# Patient Record
Sex: FEMALE | Race: WHITE | NOT HISPANIC OR LATINO | Employment: FULL TIME | URBAN - METROPOLITAN AREA
[De-identification: names, ages, dates, MRNs, and addresses within clinical notes are randomized per-mention and may not be internally consistent; named-entity substitution may affect disease eponyms.]

---

## 2017-12-27 ENCOUNTER — APPOINTMENT (OUTPATIENT)
Dept: WOMENS IMAGING | Facility: HOSPITAL | Age: 42
End: 2017-12-27

## 2017-12-27 PROCEDURE — 77063 BREAST TOMOSYNTHESIS BI: CPT | Performed by: RADIOLOGY

## 2017-12-27 PROCEDURE — 77067 SCR MAMMO BI INCL CAD: CPT | Performed by: RADIOLOGY

## 2019-08-29 ENCOUNTER — TELEPHONE (OUTPATIENT)
Dept: FAMILY MEDICINE CLINIC | Facility: CLINIC | Age: 44
End: 2019-08-29

## 2019-08-29 NOTE — TELEPHONE ENCOUNTER
"Patient Care Coordinators received pt call transferred from  [facility] at their request. Pt states she was bit by a tick about a month ago. Developed nausea without vomiting, generalized weakness and achiness, and lethargy ~5 days ago. She complains of being \"tired all the time\". She states she works and has children but this is not normal for her. She denies fever/chills/sweats, dysuria, diarrhea, sob or chest pain. She states bite site is raised and inflamed. Recommended PCP appt or UTC this week. Pt agreeable and verb understanding.  "

## 2019-09-03 ENCOUNTER — OFFICE VISIT (OUTPATIENT)
Dept: FAMILY MEDICINE CLINIC | Facility: CLINIC | Age: 44
End: 2019-09-03

## 2019-09-03 VITALS
HEIGHT: 64 IN | RESPIRATION RATE: 16 BRPM | WEIGHT: 137 LBS | SYSTOLIC BLOOD PRESSURE: 110 MMHG | DIASTOLIC BLOOD PRESSURE: 70 MMHG | BODY MASS INDEX: 23.39 KG/M2 | HEART RATE: 72 BPM | OXYGEN SATURATION: 100 %
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
## 2019-09-03 DIAGNOSIS — R53.82 CHRONIC FATIGUE: ICD-10-CM

## 2019-09-03 DIAGNOSIS — M79.10 MYALGIA: Primary | ICD-10-CM

## 2019-09-03 DIAGNOSIS — M25.50 ARTHRALGIA OF MULTIPLE JOINTS: ICD-10-CM

## 2019-09-03 PROCEDURE — 99204 OFFICE O/P NEW MOD 45 MIN: CPT | Performed by: NURSE PRACTITIONER

## 2019-09-03 RX ORDER — DOXYCYCLINE 100 MG/1
CAPSULE ORAL
Refills: 0 | COMMUNITY
Start: 2019-08-29 | End: 2019-09-11

## 2019-09-03 NOTE — PROGRESS NOTES
Subjective   Leandra Ly is a 44 y.o. female.     History of Present Illness   Leandra Ly 44 y.o. female who presents today for a new patient appointment.    she has a history of There is no problem list on file for this patient.  .  she is here to establish care I reviewed the PFSH recorded today by my MA/LPN staff.   she was seen at [facility] on 8/29/19 for tick bite and fatigue.  She had tick borne disease panel which has not resulted yet but was started on doxycycline for 10 days.  She reports feeling slightly improved.  She states the tick was immediately removed when she noticed it and it was the same day.  She denies any other known tick bite.      She does report FH of rheumatoid arthritis.  Denies any hx of joint pain or swelling.  Denies fever or chills.  Reports mostly extreme fatigue and muscle aches for 2-3 weeks.       The following portions of the patient's history were reviewed and updated as appropriate: allergies, current medications, past family history, past medical history, past social history, past surgical history and problem list.    Review of Systems   Constitutional: Positive for fatigue. Negative for chills, fever and unexpected weight change.   Eyes: Negative for visual disturbance.   Respiratory: Negative for shortness of breath.    Cardiovascular: Negative for chest pain and palpitations.   Gastrointestinal: Negative for constipation, diarrhea, nausea and vomiting.   Musculoskeletal: Positive for arthralgias and myalgias. Negative for back pain.   Psychiatric/Behavioral: Negative for behavioral problems.       Objective   Physical Exam   Constitutional: She is oriented to person, place, and time. She appears well-developed and well-nourished.   Cardiovascular: Normal rate and regular rhythm.   Pulmonary/Chest: Effort normal and breath sounds normal.   Neurological: She is alert and oriented to person, place, and time.   Psychiatric: She has a normal mood and affect. Her behavior is  normal. Judgment and thought content normal.   Nursing note and vitals reviewed.      Assessment/Plan   Leandra was seen today for establish care and follow up tick bite.    Diagnoses and all orders for this visit:    Myalgia  -     Comprehensive metabolic panel  -     Lipid panel  -     CBC and Differential  -     TSH  -     T4, free  -     Lyme, IgM, Early Test / Reflex  -     [place] SF (IgG / M)  -     Lyme, Total Antibody Test / Reflex  -     JILLIAN  -     Rheumatoid Factor, Quant    Arthralgia of multiple joints  -     Comprehensive metabolic panel  -     Lipid panel  -     CBC and Differential  -     TSH  -     T4, free  -     Lyme, IgM, Early Test / Reflex  -     [place] SF (IgG / M)  -     Lyme, Total Antibody Test / Reflex  -     JILLIAN  -     Rheumatoid Factor, Quant    Chronic fatigue  -     Comprehensive metabolic panel  -     Lipid panel  -     CBC and Differential  -     TSH  -     T4, free  -     Lyme, IgM, Early Test / Reflex  -     [place] SF (IgG / M)  -     Lyme, Total Antibody Test / Reflex  -     JILLIAN  -     Rheumatoid Factor, Quant    Tick bite, subsequent encounter  -     Comprehensive metabolic panel  -     Lipid panel  -     CBC and Differential  -     TSH  -     T4, free  -     Lyme, IgM, Early Test / Reflex  -     [place] SF (IgG / M)  -     Lyme, Total Antibody Test / Reflex  -     JILLIAN  -     Rheumatoid Factor, Quant

## 2019-09-05 LAB
ALBUMIN SERPL-MCNC: 4.8 G/DL (ref 3.5–5.2)
ALBUMIN/GLOB SERPL: 1.8 G/DL
ALP SERPL-CCNC: 50 U/L (ref 39–117)
ALT SERPL-CCNC: 11 U/L (ref 1–33)
ANA SER QL: NEGATIVE
AST SERPL-CCNC: 16 U/L (ref 1–32)
B BURGDOR IGG+IGM SER-ACNC: <0.91 ISR (ref 0–0.9)
B BURGDOR IGM SER IA-ACNC: <0.8 INDEX (ref 0–0.79)
BASOPHILS # BLD AUTO: 0.05 10*3/MM3 (ref 0–0.2)
BASOPHILS NFR BLD AUTO: 0.9 % (ref 0–1.5)
BILIRUB SERPL-MCNC: 0.5 MG/DL (ref 0.2–1.2)
BUN SERPL-MCNC: 10 MG/DL (ref 6–20)
BUN/CREAT SERPL: 17.5 (ref 7–25)
CALCIUM SERPL-MCNC: 9.7 MG/DL (ref 8.6–10.5)
CHLORIDE SERPL-SCNC: 103 MMOL/L (ref 98–107)
CHOLEST SERPL-MCNC: 214 MG/DL (ref 0–200)
CO2 SERPL-SCNC: 27.4 MMOL/L (ref 22–29)
CREAT SERPL-MCNC: 0.57 MG/DL (ref 0.57–1)
EOSINOPHIL # BLD AUTO: 0.08 10*3/MM3 (ref 0–0.4)
EOSINOPHIL NFR BLD AUTO: 1.5 % (ref 0.3–6.2)
ERYTHROCYTE [DISTWIDTH] IN BLOOD BY AUTOMATED COUNT: 12 % (ref 12.3–15.4)
GLOBULIN SER CALC-MCNC: 2.7 GM/DL
GLUCOSE SERPL-MCNC: 80 MG/DL (ref 65–99)
HCT VFR BLD AUTO: 43.7 % (ref 34–46.6)
HDLC SERPL-MCNC: 100 MG/DL (ref 40–60)
HGB BLD-MCNC: 13.7 G/DL (ref 12–15.9)
IMM GRANULOCYTES # BLD AUTO: 0.01 10*3/MM3 (ref 0–0.05)
IMM GRANULOCYTES NFR BLD AUTO: 0.2 % (ref 0–0.5)
LDLC SERPL CALC-MCNC: 97 MG/DL (ref 0–100)
LYMPHOCYTES # BLD AUTO: 1.66 10*3/MM3 (ref 0.7–3.1)
LYMPHOCYTES NFR BLD AUTO: 30.3 % (ref 19.6–45.3)
MCH RBC QN AUTO: 29.8 PG (ref 26.6–33)
MCHC RBC AUTO-ENTMCNC: 31.4 G/DL (ref 31.5–35.7)
MCV RBC AUTO: 95 FL (ref 79–97)
MONOCYTES # BLD AUTO: 0.53 10*3/MM3 (ref 0.1–0.9)
MONOCYTES NFR BLD AUTO: 9.7 % (ref 5–12)
NEUTROPHILS # BLD AUTO: 3.14 10*3/MM3 (ref 1.7–7)
NEUTROPHILS NFR BLD AUTO: 57.4 % (ref 42.7–76)
NRBC BLD AUTO-RTO: 0 /100 WBC (ref 0–0.2)
PLATELET # BLD AUTO: 280 10*3/MM3 (ref 140–450)
POTASSIUM SERPL-SCNC: 4.9 MMOL/L (ref 3.5–5.2)
PROT SERPL-MCNC: 7.5 G/DL (ref 6–8.5)
R RICKETTSI IGG SER QL IA: NEGATIVE
R RICKETTSI IGM SER-ACNC: 0.18 INDEX (ref 0–0.89)
RBC # BLD AUTO: 4.6 10*6/MM3 (ref 3.77–5.28)
RHEUMATOID FACT SERPL-ACNC: <10 IU/ML (ref 0–13.9)
SODIUM SERPL-SCNC: 141 MMOL/L (ref 136–145)
T4 FREE SERPL-MCNC: 1.21 NG/DL (ref 0.93–1.7)
TRIGL SERPL-MCNC: 87 MG/DL (ref 0–150)
TSH SERPL DL<=0.005 MIU/L-ACNC: 1.76 UIU/ML (ref 0.27–4.2)
VLDLC SERPL CALC-MCNC: 17.4 MG/DL
WBC # BLD AUTO: 5.47 10*3/MM3 (ref 3.4–10.8)

## 2019-09-12 RX ORDER — PREDNISONE 20 MG/1
TABLET ORAL
Qty: 20 TABLET | Refills: 0 | OUTPATIENT
Start: 2019-09-12
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
## 2019-09-13 RX ORDER — PREDNISONE 20 MG/1
TABLET ORAL
Qty: 20 TABLET | Refills: 0 | Status: SHIPPED | OUTPATIENT
Start: 2019-09-13 | End: 2019-09-13 | Stop reason: SDUPTHER

## 2019-09-13 RX ORDER — PREDNISONE 20 MG/1
TABLET ORAL
Qty: 20 TABLET | Refills: 0 | Status: SHIPPED | OUTPATIENT
Start: 2019-09-13 | End: 2020-05-15

## 2019-09-18 ENCOUNTER — RESULTS ENCOUNTER (OUTPATIENT)
Dept: FAMILY MEDICINE CLINIC | Facility: CLINIC | Age: 44
End: 2019-09-18
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
## 2019-09-30 ENCOUNTER — APPOINTMENT (OUTPATIENT)
Dept: WOMENS IMAGING | Facility: HOSPITAL | Age: 44
End: 2019-09-30

## 2019-09-30 PROCEDURE — 77063 BREAST TOMOSYNTHESIS BI: CPT | Performed by: RADIOLOGY

## 2019-09-30 PROCEDURE — 77067 SCR MAMMO BI INCL CAD: CPT | Performed by: RADIOLOGY

## 2020-05-15 ENCOUNTER — OFFICE VISIT (OUTPATIENT)
Dept: FAMILY MEDICINE CLINIC | Facility: CLINIC | Age: 45
End: 2020-05-15

## 2020-05-15 VITALS
DIASTOLIC BLOOD PRESSURE: 60 MMHG | OXYGEN SATURATION: 97 % | SYSTOLIC BLOOD PRESSURE: 100 MMHG | RESPIRATION RATE: 16 BRPM | HEART RATE: 57 BPM | TEMPERATURE: 98.6 F

## 2020-05-15 DIAGNOSIS — M94.0 COSTOCHONDRITIS: ICD-10-CM

## 2020-05-15 DIAGNOSIS — L98.9 SKIN LESION: Primary | ICD-10-CM

## 2020-05-15 PROCEDURE — 99213 OFFICE O/P EST LOW 20 MIN: CPT | Performed by: FAMILY MEDICINE

## 2020-05-15 RX ORDER — NAPROXEN 500 MG/1
500 TABLET ORAL 2 TIMES DAILY WITH MEALS
Qty: 60 TABLET | Refills: 0 | Status: SHIPPED | OUTPATIENT
Start: 2020-05-15 | End: 2020-12-03

## 2020-05-15 RX ORDER — FAMOTIDINE 20 MG/1
20 TABLET, FILM COATED ORAL
COMMUNITY
Start: 2020-05-09 | End: 2020-12-03

## 2020-05-15 NOTE — PROGRESS NOTES
Subjective   Leandra Ly is a 44 y.o. female.     History of Present Illness     44-year-old female who is a new patient to me presents today for follow-up on chest pain and tick bite from last year.    Chest pain: Was seen in urgent care 6 days ago.  At urgent care she reported chest hurt when she would take a deep breath.  Pain reproducible with coughing or sneezing.  Pain not reproducible with palpation.  Discussed that pain could be due to gas or GERD.  Advised to take Pepcid and some antacids and apply warm compresses to chest wall.  Since then patient notes that pain is little bit better. Chest x-ray report from 5/9/2020 shows no left rib abnormality no pneumothorax no acute cardiopulmonary radiographic abnormality.    Tick bite: Treated last year but still has a spot.  Has not been seen by Derm.        The following portions of the patient's history were reviewed and updated as appropriate: allergies, current medications, past family history, past medical history, past social history, past surgical history and problem list.    Review of Systems   Constitutional: Negative for chills and fever.   HENT: Negative for congestion.    Respiratory: Negative for cough and shortness of breath.    Cardiovascular: Positive for chest pain (mild chest wall tenderness improved). Negative for palpitations and leg swelling.   Gastrointestinal: Negative for abdominal pain, diarrhea and vomiting.   Skin: Positive for skin lesions.       Objective   Physical Exam   Constitutional: She appears well-developed and well-nourished.   HENT:   Head: Normocephalic and atraumatic.   Mouth/Throat: Uvula is midline, oropharynx is clear and moist and mucous membranes are normal.   Eyes: Pupils are equal, round, and reactive to light. EOM are normal.   Cardiovascular: Normal rate and regular rhythm.   No murmur heard.  Pulmonary/Chest: Effort normal and breath sounds normal. No stridor. No respiratory distress. She has no wheezes. She has no  rales.       Neurological: She is alert.   Skin: Skin is warm.        Psychiatric: She has a normal mood and affect. Her behavior is normal.               Assessment/Plan     Leandra was seen today for chest pain and tick bite.    Diagnoses and all orders for this visit:    Skin lesion  -     Ambulatory Referral to Dermatology    Costochondritis  -     naproxen (NAPROSYN) 500 MG tablet; Take 1 tablet by mouth 2 (Two) Times a Day With Meals.  -     diclofenac (VOLTAREN) 1 % gel gel; Apply 4 g topically to the appropriate area as directed 4 (Four) Times a Day As Needed (pain).      Go to Er for new/worsening symptoms. Cont warm compressess. See attached references.    At this time patient is not suspected of having COVID 19.  Answered patient questions about COVID 19 including signs and symptoms, self-harm care and warning signs to look for especially the worsening of symptoms and respiratory distress day 8/9.  Advised to seek care to call first to allow for proper isolation precautions.

## 2020-12-02 LAB — SARS-COV-2 RNA RESP QL NAA+PROBE: NOT DETECTED

## 2020-12-03 ENCOUNTER — TELEPHONE (OUTPATIENT)
Dept: URGENT CARE | Facility: CLINIC | Age: 45
End: 2020-12-03

## 2020-12-03 ENCOUNTER — TELEMEDICINE (OUTPATIENT)
Dept: FAMILY MEDICINE CLINIC | Facility: CLINIC | Age: 45
End: 2020-12-03

## 2020-12-03 DIAGNOSIS — R53.82 CHRONIC FATIGUE: ICD-10-CM

## 2020-12-03 DIAGNOSIS — M79.10 MYALGIA: ICD-10-CM

## 2020-12-03 DIAGNOSIS — J01.00 ACUTE MAXILLARY SINUSITIS, RECURRENCE NOT SPECIFIED: Primary | ICD-10-CM

## 2020-12-03 DIAGNOSIS — Z00.00 LABORATORY EXAM ORDERED AS PART OF ROUTINE GENERAL MEDICAL EXAMINATION: ICD-10-CM

## 2020-12-03 PROCEDURE — 99213 OFFICE O/P EST LOW 20 MIN: CPT | Performed by: NURSE PRACTITIONER

## 2020-12-03 RX ORDER — AMOXICILLIN AND CLAVULANATE POTASSIUM 875; 125 MG/1; MG/1
1 TABLET, FILM COATED ORAL 2 TIMES DAILY
Qty: 20 TABLET | Refills: 0 | Status: SHIPPED | OUTPATIENT
Start: 2020-12-03 | End: 2020-12-13

## 2020-12-03 NOTE — PROGRESS NOTES
Subjective   Leandra Ly is a 45 y.o. female.   You have chosen to receive care through a telehealth visit.  Do you consent to use a video/audio connection for your medical care today? Yes  History of Present Illness   Leandra Ly 45 y.o. female who presents for evaluation of sinus pressure and congestion. Symptoms include headache and sinus pain.  Onset of symptoms was several days ago, unchanged since that time. Patient denies shortness of breath, wheezing, fever.   Evaluation to date: was seen at [facility] Treatment to date:  negative COVID test. Reports worsening of her chronic fatigue.  She has noticed myalgias off and on for several months.  [place]       The following portions of the patient's history were reviewed and updated as appropriate: allergies, current medications, past family history, past medical history, past social history, past surgical history and problem list.    Review of Systems   Constitutional: Positive for fatigue. Negative for chills, fever and unexpected weight change.   HENT: Positive for congestion, ear pain, sinus pressure and sinus pain.    Respiratory: Negative for cough, chest tightness and shortness of breath.    Cardiovascular: Negative for chest pain and palpitations.   Endocrine: Negative for cold intolerance, heat intolerance, polydipsia, polyphagia and polyuria.   Musculoskeletal: Positive for myalgias. Negative for joint swelling.   Neurological: Positive for headaches.   Psychiatric/Behavioral: Negative for behavioral problems.       Objective   Physical Exam  Vitals signs and nursing note reviewed.   Constitutional:       Appearance: She is well-developed.   Pulmonary:      Effort: Pulmonary effort is normal.   Neurological:      Mental Status: She is alert and oriented to person, place, and time.   Psychiatric:         Mood and Affect: Mood normal.         Behavior: Behavior  normal.         Thought Content: Thought content normal.         Judgment: Judgment normal.         Assessment/Plan   Diagnoses and all orders for this visit:    1. Acute maxillary sinusitis, recurrence not specified (Primary)  -     amoxicillin-clavulanate (Augmentin) 875-125 MG per tablet; Take 1 tablet by mouth 2 (Two) Times a Day for 10 days.  Dispense: 20 tablet; Refill: 0    2. Chronic fatigue  -     Comprehensive metabolic panel  -     Lipid panel  -     CBC and Differential  -     TSH  -     Vitamin D 25 Hydroxy  -     Cytomegalovirus Antibody, IgG  -     JILLIAN  -     [place] SF (IgG / M)  -     Rheumatoid Factor  -     Ehrlichia Chaffeensis PCR  -     West Nile Antibodies, IgG & IgM  -     EBV Antibody VCA, IgG    3. Myalgia  -     Comprehensive metabolic panel  -     CBC and Differential  -     Vitamin D 25 Hydroxy  -     Cytomegalovirus Antibody, IgG  -     JILLIAN  -     [place] SF (IgG / M)  -     Rheumatoid Factor  -     Ehrlichia Chaffeensis PCR  -     [place] Nile Antibodies, IgG & IgM  -     EBV Antibody VCA, IgG    4. Laboratory exam ordered as part of routine general medical examination  -     Comprehensive metabolic panel  -     Lipid panel  -     CBC and Differential  -     TSH          This visit has been rescheduled as a phone/video visit to comply with patient safety concerns in accordance with CDC recommendations. Total time of discussion was 13 minutes.

## 2020-12-16 LAB
25(OH)D3+25(OH)D2 SERPL-MCNC: 30.5 NG/ML (ref 30–100)
ALBUMIN SERPL-MCNC: 4.5 G/DL (ref 3.5–5.2)
ALBUMIN/GLOB SERPL: 2 G/DL
ALP SERPL-CCNC: 57 U/L (ref 39–117)
ALT SERPL-CCNC: 13 U/L (ref 1–33)
ANA SER QL: NEGATIVE
AST SERPL-CCNC: 13 U/L (ref 1–32)
BASOPHILS # BLD AUTO: 0.02 10*3/MM3 (ref 0–0.2)
BASOPHILS NFR BLD AUTO: 0.6 % (ref 0–1.5)
BILIRUB SERPL-MCNC: 0.4 MG/DL (ref 0–1.2)
BUN SERPL-MCNC: 8 MG/DL (ref 6–20)
BUN/CREAT SERPL: 10.8 (ref 7–25)
CALCIUM SERPL-MCNC: 9.1 MG/DL (ref 8.6–10.5)
CHLORIDE SERPL-SCNC: 102 MMOL/L (ref 98–107)
CHOLEST SERPL-MCNC: 183 MG/DL (ref 0–200)
CMV IGG SERPL IA-ACNC: <0.6 U/ML (ref 0–0.59)
CO2 SERPL-SCNC: 26.9 MMOL/L (ref 22–29)
CREAT SERPL-MCNC: 0.74 MG/DL (ref 0.57–1)
E CHAFFEENSIS DNA BLD QL NAA+PROBE: NEGATIVE
EBV VCA IGG SER IA-ACNC: >600 U/ML (ref 0–17.9)
EOSINOPHIL # BLD AUTO: 0.02 10*3/MM3 (ref 0–0.4)
EOSINOPHIL NFR BLD AUTO: 0.6 % (ref 0.3–6.2)
ERYTHROCYTE [DISTWIDTH] IN BLOOD BY AUTOMATED COUNT: 11.8 % (ref 12.3–15.4)
GLOBULIN SER CALC-MCNC: 2.2 GM/DL
GLUCOSE SERPL-MCNC: 91 MG/DL (ref 65–99)
HCT VFR BLD AUTO: 46.7 % (ref 34–46.6)
HDLC SERPL-MCNC: 83 MG/DL (ref 40–60)
HGB BLD-MCNC: 15.4 G/DL (ref 12–15.9)
IMM GRANULOCYTES # BLD AUTO: 0.01 10*3/MM3 (ref 0–0.05)
IMM GRANULOCYTES NFR BLD AUTO: 0.3 % (ref 0–0.5)
LDLC SERPL CALC-MCNC: 86 MG/DL (ref 0–100)
LYMPHOCYTES # BLD AUTO: 0.82 10*3/MM3 (ref 0.7–3.1)
LYMPHOCYTES NFR BLD AUTO: 25.1 % (ref 19.6–45.3)
MCH RBC QN AUTO: 29.3 PG (ref 26.6–33)
MCHC RBC AUTO-ENTMCNC: 33 G/DL (ref 31.5–35.7)
MCV RBC AUTO: 89 FL (ref 79–97)
MONOCYTES # BLD AUTO: 0.37 10*3/MM3 (ref 0.1–0.9)
MONOCYTES NFR BLD AUTO: 11.3 % (ref 5–12)
NEUTROPHILS # BLD AUTO: 2.03 10*3/MM3 (ref 1.7–7)
NEUTROPHILS NFR BLD AUTO: 62.1 % (ref 42.7–76)
NRBC BLD AUTO-RTO: 0 /100 WBC (ref 0–0.2)
PLATELET # BLD AUTO: 202 10*3/MM3 (ref 140–450)
POTASSIUM SERPL-SCNC: 4.4 MMOL/L (ref 3.5–5.2)
PROT SERPL-MCNC: 6.7 G/DL (ref 6–8.5)
R RICKETTSI IGG SER QL IA: NEGATIVE
R RICKETTSI IGM SER-ACNC: 0.19 INDEX (ref 0–0.89)
RBC # BLD AUTO: 5.25 10*6/MM3 (ref 3.77–5.28)
RHEUMATOID FACT SERPL-ACNC: <10 IU/ML (ref 0–13.9)
SODIUM SERPL-SCNC: 138 MMOL/L (ref 136–145)
TRIGL SERPL-MCNC: 79 MG/DL (ref 0–150)
TSH SERPL DL<=0.005 MIU/L-ACNC: 1.64 UIU/ML (ref 0.27–4.2)
VLDLC SERPL CALC-MCNC: 14 MG/DL (ref 5–40)
WBC # BLD AUTO: 3.27 10*3/MM3 (ref 3.4–10.8)
WNV IGG SER QL IA: NEGATIVE
WNV IGM SER QL IA: NEGATIVE

## 2021-02-25 ENCOUNTER — TELEMEDICINE (OUTPATIENT)
Dept: FAMILY MEDICINE CLINIC | Facility: CLINIC | Age: 46
End: 2021-02-25

## 2021-02-25 DIAGNOSIS — M26.622 ARTHRALGIA OF LEFT TEMPOROMANDIBULAR JOINT: ICD-10-CM

## 2021-02-25 DIAGNOSIS — R60.0 SALIVARY GLAND SWELLING: Primary | ICD-10-CM

## 2021-02-25 PROCEDURE — 99213 OFFICE O/P EST LOW 20 MIN: CPT | Performed by: NURSE PRACTITIONER

## 2021-02-25 RX ORDER — METHYLPREDNISOLONE 4 MG/1
TABLET ORAL
Qty: 21 TABLET | Refills: 0 | Status: SHIPPED | OUTPATIENT
Start: 2021-02-25 | End: 2021-12-28 | Stop reason: SDUPTHER

## 2021-02-25 RX ORDER — AZITHROMYCIN 500 MG/1
500 TABLET, FILM COATED ORAL DAILY
Qty: 5 TABLET | Refills: 0 | Status: SHIPPED | OUTPATIENT
Start: 2021-02-25 | End: 2021-12-28 | Stop reason: SDUPTHER

## 2021-02-25 NOTE — PROGRESS NOTES
Subjective   Leandra Ly is a 45 y.o. female.   You have chosen to receive care through a telehealth visit.  Do you consent to use a video/audio connection for your medical care today? Yes  History of Present Illness   Patient presents to office for left preauricular pain, left throat/anterior neck tenderness and ear pain. Started 2 days ago.  She reports neck is tender to touch and tenderness to left TMJ area.  She report pain with depression of mandible.  Tenderness to left salivary gland and states it feels slightly swollen compared to the right side. She denies redness or warmth. Reports some mild congestion. Denies cough, sore throat, difficulty swallowing, fever or chills. Denies headache, frontal or maxillary tenderness, tooth pain.   The following portions of the patient's history were reviewed and updated as appropriate: allergies, current medications, past family history, past medical history, past social history, past surgical history and problem list.    Review of Systems   Constitutional: Negative for chills, fever and unexpected weight change.   HENT: Positive for congestion and ear pain. Negative for sinus pain and sore throat.    Respiratory: Negative for cough, chest tightness and shortness of breath.    Cardiovascular: Negative for chest pain and palpitations.   Musculoskeletal: Positive for neck pain.   Psychiatric/Behavioral: Negative for behavioral problems.       Objective   Physical Exam  Vitals signs and nursing note reviewed.   Constitutional:       Appearance: Normal appearance. She is well-developed.   HENT:      Head: Normocephalic.      Jaw: Tenderness and pain on movement present.      Salivary Glands: Left salivary gland is tender.     Pulmonary:      Effort: Pulmonary effort is normal.   Neurological:      Mental Status: She is alert and oriented to person, place, and time.   Psychiatric:         Mood and Affect: Mood normal.         Behavior: Behavior normal.         Thought Content:  Thought content normal.         Judgment: Judgment normal.         Assessment/Plan   Diagnoses and all orders for this visit:    1. Salivary gland swelling (Primary)  -     methylPREDNISolone (MEDROL) 4 MG dose pack; Take as directed on package instructions.  Dispense: 21 tablet; Refill: 0  -     azithromycin (Zithromax) 500 MG tablet; Take 1 tablet by mouth Daily.  Dispense: 5 tablet; Refill: 0    2. Arthralgia of left temporomandibular joint  -     methylPREDNISolone (MEDROL) 4 MG dose pack; Take as directed on package instructions.  Dispense: 21 tablet; Refill: 0  -     azithromycin (Zithromax) 500 MG tablet; Take 1 tablet by mouth Daily.  Dispense: 5 tablet; Refill: 0        Start zithromax if no improvement in the next few days.       This visit has been rescheduled as a phone/video visit to comply with patient safety concerns in accordance with CDC recommendations. Total time of discussion was 10 minutes.

## 2021-04-06 ENCOUNTER — BULK ORDERING (OUTPATIENT)
Dept: CASE MANAGEMENT | Facility: OTHER | Age: 46
End: 2021-04-06

## 2021-04-06 DIAGNOSIS — Z23 IMMUNIZATION DUE: ICD-10-CM

## 2021-12-28 ENCOUNTER — TELEMEDICINE (OUTPATIENT)
Dept: FAMILY MEDICINE CLINIC | Facility: CLINIC | Age: 46
End: 2021-12-28

## 2021-12-28 DIAGNOSIS — U07.1 COVID-19: ICD-10-CM

## 2021-12-28 PROCEDURE — 99213 OFFICE O/P EST LOW 20 MIN: CPT | Performed by: NURSE PRACTITIONER

## 2021-12-28 RX ORDER — AZITHROMYCIN 500 MG/1
500 TABLET, FILM COATED ORAL DAILY
Qty: 5 TABLET | Refills: 0 | Status: SHIPPED | OUTPATIENT
Start: 2021-12-28

## 2021-12-28 RX ORDER — METHYLPREDNISOLONE 4 MG/1
TABLET ORAL
Qty: 21 TABLET | Refills: 0 | Status: SHIPPED | OUTPATIENT
Start: 2021-12-28

## 2021-12-28 NOTE — PROGRESS NOTES
Subjective   Leandra Ly is a 46 y.o. female.   You have chosen to receive care through a telehealth visit.  Do you consent to use a video/audio connection for your medical care today? Yes    History of Present Illness   Leandra Ly 46 y.o. female who presents for evaluation of COVID 19. Symptoms include congestion, post nasal drip, dry cough, myalgias, headache, fever, chills, fatigue, runny nose and chest tightness.  Onset of symptoms was 2 days ago, gradually worsening since that time. Patient denies shortness of breath, wheezing, pleuritic chest pain.   Evaluation to date: none Treatment to date:  Tylenol.  Temp of 103 F yesterday.        The following portions of the patient's history were reviewed and updated as appropriate: allergies, current medications, past family history, past medical history, past social history, past surgical history and problem list.    Review of Systems   Constitutional: Positive for chills, fatigue and fever.   HENT: Positive for congestion.    Respiratory: Positive for cough and chest tightness. Negative for shortness of breath and wheezing.    Cardiovascular: Negative for chest pain and palpitations.   Musculoskeletal: Positive for myalgias.   Neurological: Positive for headaches.   Psychiatric/Behavioral: Negative for behavioral problems.       Objective   Physical Exam  Vitals and nursing note reviewed.   Constitutional:       Appearance: Normal appearance. She is well-developed.   Pulmonary:      Effort: Pulmonary effort is normal.   Neurological:      Mental Status: She is alert and oriented to person, place, and time.   Psychiatric:         Mood and Affect: Mood normal.         Behavior: Behavior normal.         Thought Content: Thought content normal.         Judgment: Judgment normal.         Assessment/Plan   Diagnoses and all orders for this visit:    1. COVID-19  -     methylPREDNISolone (MEDROL) 4 MG dose pack; Take as directed on package instructions.  Dispense: 21  tablet; Refill: 0  -     azithromycin (Zithromax) 500 MG tablet; Take 1 tablet by mouth Daily.  Dispense: 5 tablet; Refill: 0      Patient does not meet qualifications for infusion.  She would like to try ivermectin as no contraindications.   Ivermectin called in to pharmacy.  Patient will take vitamin regimen as part of I-MASK protocol.     This visit has been rescheduled as a video visit to comply with patient safety concerns in accordance with CDC recommendations. Total time of encounter was 12 minutes.

## 2022-01-03 ENCOUNTER — TELEPHONE (OUTPATIENT)
Dept: FAMILY MEDICINE CLINIC | Facility: CLINIC | Age: 47
End: 2022-01-03

## 2022-01-03 RX ORDER — FLUVOXAMINE MALEATE 50 MG/1
50 TABLET, COATED ORAL 2 TIMES DAILY
Qty: 20 TABLET | Refills: 0 | Status: SHIPPED | OUTPATIENT
Start: 2022-01-03

## 2022-01-03 NOTE — TELEPHONE ENCOUNTER
Caller: Leandra Ly    Relationship to patient: Self    Best call back number:[phone]     date of positive COVID19 test: 12/27/21    COVID19 symptoms: CHEST TIGHTNESS,  ALMOST SIMILAR TO BRONCHITUS, COUGH,     Additional information or concerns: COVID SYMPTOMS GETTING WORSE     PHARMACY:  [facility] [facility] [phone] [facility] [phone] FX

## 2022-01-03 NOTE — TELEPHONE ENCOUNTER
I sent in additional medication called fluvoxamine for her to take twice a day for 10 days.  Tell her to let us know if she is unable to get this from pharmacy.  Also inquire what her current symptoms are and if she is checking her O2 at home?

## 2022-01-03 NOTE — TELEPHONE ENCOUNTER
S/w patient, she will let us know if she can not get rx.  She is checking o2 at home and it is staying at 97%.  Pt was advised when to be concerned and when to go directly to ER.

## 2023-07-27 ENCOUNTER — OFFICE VISIT (OUTPATIENT)
Dept: FAMILY MEDICINE CLINIC | Facility: CLINIC | Age: 48
End: 2023-07-27
Payer: COMMERCIAL

## 2023-07-27 VITALS
HEIGHT: 64 IN | RESPIRATION RATE: 16 BRPM | BODY MASS INDEX: 22.88 KG/M2 | OXYGEN SATURATION: 97 % | DIASTOLIC BLOOD PRESSURE: 60 MMHG | WEIGHT: 134 LBS | HEART RATE: 72 BPM | SYSTOLIC BLOOD PRESSURE: 90 MMHG

## 2023-07-27 DIAGNOSIS — M77.12 LATERAL EPICONDYLITIS OF LEFT ELBOW: Primary | ICD-10-CM

## 2023-07-27 PROCEDURE — 99213 OFFICE O/P EST LOW 20 MIN: CPT | Performed by: NURSE PRACTITIONER

## 2023-07-27 RX ORDER — IBUPROFEN 800 MG/1
800 TABLET ORAL EVERY 8 HOURS PRN
Qty: 30 TABLET | Refills: 0 | Status: SHIPPED | OUTPATIENT
Start: 2023-07-27

## 2023-07-27 NOTE — PROGRESS NOTES
Subjective   Leandra Ly is a 48 y.o. female.     History of Present Illness   Answers submitted by the patient for this visit:  Other (Submitted on 7/27/2023)  Please describe your symptoms.: Pain  and throbbing in left arm /elbow down to hand.  Have you had these symptoms before?: No  How long have you been having these symptoms?: Greater than 2 weeks  Please list any medications you are currently taking for this condition.: Multi Vitamin,  Ibuprofen  Please describe any probable cause for these symptoms. : Unknown  Primary Reason for Visit (Submitted on 7/27/2023)  What is the primary reason for your visit?: Other  Arm Pain (L arm x a month, pain from elbow down to hand.  NKI)  The following portions of the patient's history were reviewed and updated as appropriate: allergies, current medications, past family history, past medical history, past social history, past surgical history, and problem list.    Review of Systems   Constitutional:  Negative for unexpected weight change.   Respiratory:  Negative for shortness of breath.    Cardiovascular:  Negative for chest pain and palpitations.   Psychiatric/Behavioral:  Negative for behavioral problems.      Objective   Physical Exam  Vitals and nursing note reviewed.   Constitutional:       Appearance: Normal appearance. She is well-developed.   Cardiovascular:      Rate and Rhythm: Normal rate and regular rhythm.   Pulmonary:      Effort: Pulmonary effort is normal.      Breath sounds: Normal breath sounds.   Musculoskeletal:      Left elbow: Tenderness present in lateral epicondyle.      Left forearm: Tenderness present.   Neurological:      Mental Status: She is alert and oriented to person, place, and time.   Psychiatric:         Mood and Affect: Mood normal.         Behavior: Behavior normal.         Thought Content: Thought content normal.         Judgment: Judgment normal.       Assessment & Plan   Diagnoses and all orders for this visit:    1. Lateral  epicondylitis of left elbow (Primary)  -     ibuprofen (ADVIL,MOTRIN) 800 MG tablet; Take 1 tablet by mouth Every 8 (Eight) Hours As Needed for Mild Pain.  Dispense: 30 tablet; Refill: 0        She will get tennis elbow brace and Voltaren gel.

## 2023-09-12 ENCOUNTER — TELEPHONE (OUTPATIENT)
Dept: FAMILY MEDICINE CLINIC | Facility: CLINIC | Age: 48
End: 2023-09-12

## 2023-09-12 NOTE — TELEPHONE ENCOUNTER
Caller: Leandra Ly    Relationship to patient: Self    Best call back number: [phone]*    Chief complaint: LEFT SIDE CHEST/BREAST PAIN. PATIENT STATES PAIN STARTED YESTERDAY. THE PATIENT STATES THAT SHE HAS TAKEN BABY ASPIRIN AND ANTIACIDS, WITH NO RELIEF. THE PATIENT STATES THAT SHE WAS DIAGNOSED WITH COVID 2 WEEKS AGO.    Patient directed to call 911 or go to their nearest emergency room.     Patient verbalized understanding: [x] Yes  [] No  If no, why?    Additional notes:PATIENT STATES SHE WILL GO TO EITHER [place] OR [facility] EMERGENCY ROOM TODAY.

## 2024-01-04 ENCOUNTER — TELEPHONE (OUTPATIENT)
Dept: FAMILY MEDICINE CLINIC | Facility: CLINIC | Age: 49
End: 2024-01-04

## 2024-01-04 NOTE — TELEPHONE ENCOUNTER
Caller: Leandra Ly    Relationship to patient: Self    Best call back number: [phone]    Chief complaint: CONGESTION, EARS FEEL FULL - HARD TO HEAR      Type of visit: SAME DAY OR MY CHART VIDEO VISIT     Requested date: 01/04/2024     Additional notes: PATIENT IS REQUESTING A CALL BACK

## 2024-01-08 RX ORDER — IBUPROFEN 800 MG/1
800 TABLET ORAL EVERY 8 HOURS PRN
Qty: 90 TABLET | Refills: 1 | Status: CANCELLED | OUTPATIENT
Start: 2024-01-08

## 2024-01-09 ENCOUNTER — TELEMEDICINE (OUTPATIENT)
Dept: FAMILY MEDICINE CLINIC | Facility: CLINIC | Age: 49
End: 2024-01-09
Payer: COMMERCIAL

## 2024-01-09 VITALS — HEIGHT: 64 IN | BODY MASS INDEX: 23.05 KG/M2 | WEIGHT: 135 LBS

## 2024-01-09 DIAGNOSIS — J01.00 ACUTE NON-RECURRENT MAXILLARY SINUSITIS: Primary | ICD-10-CM

## 2024-01-09 RX ORDER — NAPROXEN 500 MG/1
500 TABLET ORAL
COMMUNITY
Start: 2023-09-17

## 2024-01-09 RX ORDER — AMOXICILLIN AND CLAVULANATE POTASSIUM 875; 125 MG/1; MG/1
1 TABLET, FILM COATED ORAL EVERY 12 HOURS SCHEDULED
Qty: 20 TABLET | Refills: 0 | Status: SHIPPED | OUTPATIENT
Start: 2024-01-09

## 2024-01-09 NOTE — PROGRESS NOTES
Subjective   Leandra Ly is a 48 y.o. female.     CC: VV for URI-Sx    Patient of Leslie's is seen today on a video visit reporting 2 to 3-week history of now worsening facial pressure/pain, colored discharge, and now right ear pressure.  Denies fever/chills and only mildly coughs at nighttime.  Patient has a history of sinus issues.      The following portions of the patient's history were reviewed and updated as appropriate: allergies, current medications, past family history, past medical history, past social history, past surgical history, and problem list.    Review of Systems   Constitutional:  Negative for activity change, chills and fever.   HENT:  Positive for congestion, ear pain and sinus pressure.    Respiratory:  Negative for cough.    Cardiovascular:  Negative for chest pain.   Neurological:  Positive for headaches.   Psychiatric/Behavioral:  Negative for dysphoric mood.        Objective   Physical Exam  Constitutional:       General: She is not in acute distress.     Appearance: She is well-developed.   HENT:      Nose:      Right Sinus: Maxillary sinus tenderness (pt points here) present.      Left Sinus: Maxillary sinus tenderness (pt points here) present.   Pulmonary:      Effort: Pulmonary effort is normal.   Neurological:      Mental Status: She is alert and oriented to person, place, and time.   Psychiatric:         Behavior: Behavior normal.         Thought Content: Thought content normal.         Assessment & Plan   Diagnoses and all orders for this visit:    1. Acute non-recurrent maxillary sinusitis (Primary)  -     amoxicillin-clavulanate (AUGMENTIN) 875-125 MG per tablet; Take 1 tablet by mouth Every 12 (Twelve) Hours.  Dispense: 20 tablet; Refill: 0        Spent  11   minutes with chart and interview and consent for this encounter given by the patient.  You have chosen to receive care through a telehealth visit.  Do you consent to use a video/audio connection for your medical care today?  Yes  Patient was in their residence when this visit took place and I was in my medical office.